# Patient Record
Sex: MALE | Race: WHITE
[De-identification: names, ages, dates, MRNs, and addresses within clinical notes are randomized per-mention and may not be internally consistent; named-entity substitution may affect disease eponyms.]

---

## 2018-11-27 ENCOUNTER — HOSPITAL ENCOUNTER (EMERGENCY)
Dept: HOSPITAL 61 - ER | Age: 53
Discharge: HOME | End: 2018-11-27
Payer: COMMERCIAL

## 2018-11-27 VITALS — DIASTOLIC BLOOD PRESSURE: 83 MMHG | SYSTOLIC BLOOD PRESSURE: 154 MMHG

## 2018-11-27 VITALS — WEIGHT: 240 LBS | HEIGHT: 69 IN | BODY MASS INDEX: 35.55 KG/M2
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
## 2018-11-27 DIAGNOSIS — M25.522: ICD-10-CM

## 2018-11-27 DIAGNOSIS — S22.32XA: Primary | ICD-10-CM

## 2018-11-27 DIAGNOSIS — I10: ICD-10-CM
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
## 2018-11-27 DIAGNOSIS — E11.9: ICD-10-CM

## 2018-11-27 LAB
APTT PPP: YELLOW S
BACTERIA #/AREA URNS HPF: 0 /HPF
BILIRUB UR QL STRIP: NEGATIVE
FIBRINOGEN PPP-MCNC: CLEAR MG/DL
HYALINE CASTS #/AREA URNS LPF: (no result) /HPF
NITRITE UR QL STRIP: NEGATIVE
PH UR STRIP: 5.5 [PH]
PROT UR STRIP-MCNC: NEGATIVE MG/DL
SQUAMOUS #/AREA URNS LPF: (no result) /LPF
UROBILINOGEN UR-MCNC: 0.2 MG/DL
WBC #/AREA URNS HPF: 0 /HPF (ref 0–4)

## 2018-11-27 PROCEDURE — 81001 URINALYSIS AUTO W/SCOPE: CPT

## 2018-11-27 PROCEDURE — 71101 X-RAY EXAM UNILAT RIBS/CHEST: CPT

## 2018-11-27 NOTE — RAD
Left RIBS with chest, 3 views, 11/27/2018:

 

HISTORY: Fall, rib pain

 

There is a lucency projected over the lateral aspect of the left 10th rib 

on one view suggesting a nondisplaced fracture. No other definite fracture

or rib abnormality is seen. There is no evidence of underlying 

pneumothorax, hemothorax or pulmonary infiltrate. The heart size is 

normal.

 

IMPRESSION: Probable left 10th rib fracture.

 

Electronically signed by: Rick Moritz, MD (11/27/2018 10:09 AM) [facility]

## 2018-11-27 NOTE — PHYS DOC
Past Medical History


Past Medical History:  Diabetes-Type II, Hypertension


Past Surgical History:  Other


Additional Past Surgical Histo:  right knee


Alcohol Use:  Rarely


Drug Use:  None





Adult General


Chief Complaint


Chief Complaint:  MECHANICAL FALL





HPI


HPI


Patient is a 53-year-old male who presents to the emergency department for 

evaluation. He states he was walking up the stairs at work, when he slipped on 

the ice, and fell. He states he landed on his left side, and is primarily 

having pain in his left lateral costal margin area. He did not hit his head, 

denies any headache or neck pain, and although he did hit his left elbow is not 

having any extremity pain. He also landed on his buttocks, although he is not 

having any coccygeal pain. Denies any neck or back pain, or any other extremity 

pain or other painful areas. Palpation of his left rib area as well as movement 

seems to worsen his pain in his ribs. There are no alleviating factors to his 

symptoms otherwise.





Review of Systems


Review of Systems





Constitutional: Denies fever or chills []


Eyes: Denies change in visual acuity, redness, or eye pain []


HENT: Denies nasal congestion or sore throat []


Respiratory: Denies cough or shortness of breath []


Cardiovascular:The patient denies any shortness of breath, chest pain, 

palpitations, or orthopnea []


GI: Denies abdominal pain, nausea, vomiting, bloody stools or diarrhea []


: Denies dysuria or hematuria []


Musculoskeletal: Denies back pain or joint pain, except as noted in the history 

of present illness. []


Integument: Denies rash or skin lesions []


Neurologic: Denies headache, focal weakness or sensory changes []








All other systems were reviewed and found to be within normal limits, except as 

documented in this note.





Current Medications


Current Medications





Current Medications








 Medications


  (Trade)  Dose


 Ordered  Sig/Mckayla  Start Time


 Stop Time Status Last Admin


Dose Admin


 


 Oxycodone/


 Acetaminophen


  (Percocet 5/325)  1 tab  1X  ONCE  11/27/18 09:30


 11/27/18 09:31 DC 11/27/18 09:47


1 TAB











Allergies


Allergies





Allergies








Coded Allergies Type Severity Reaction Last Updated Verified


 


  No Known Drug Allergies    11/27/18 No











Physical Exam


Physical Exam


PHYSICAL EXAM:





CONSTITUTIONAL: Well developed, well nourished


HEAD: normocephalic, atraumatic


EENT: PERRL, EOMI. Conjunctivae normal color, sclerae non-icteric; moist mucous 

membranes.


NECK: Supple, non-tender; no meningismus.There is full, painless range of 

motion of the cervical spine, without any focal bony midline tenderness to 

palpation.


LUNGS: Lungs CTA, breathing even and unlabored. Normal air movement.


HEART: Regular rate and rhythm, no murmur


CHEST: No deformity; there is tenderness to palpation noted to the left lateral 

inferior costal margin. There is no crepitus.


ABDOMEN: The abdomen is soft, and non-tender, no masses or bruits. Specifically

, the left upper quadrant of the abdomen is nontender.


EXTREM: Normal ROM; no deformity, no calf tenderness. Normal pulses palpable in 

all extremities. There is no pedal edema. There is no bony tenderness to 

palpation to the left elbow. There is normal range of motion. The remainder of 

the extremities are atraumatic.


SKIN: No rash; no diaphoresis


NEURO: Alert; normal speech and cognition; CN's grossly intact; strength 

grossly intact without focal deficit.


BACK: No CVA TTP.There is no bony tenderness to palpation of the thoracic or 

lumbar spine.





Current Patient Data


Vital Signs





 Vital Signs








  Date Time  Temp Pulse Resp B/P (MAP) Pulse Ox O2 Delivery O2 Flow Rate FiO2


 


11/27/18 09:05 97.9 80 17 154/83 (106) 98 Room Air  





 97.9       








Lab Values





 Laboratory Tests








Test


 11/27/18


10:13


 


Urine Collection Type Unknown  


 


Urine Color Yellow  


 


Urine Clarity Clear  


 


Urine pH 5.5  


 


Urine Specific Gravity 1.025  


 


Urine Protein


 Negative mg/dL


(NEG-TRACE)


 


Urine Glucose (UA)


 >=1000 mg/dL


(NEG)


 


Urine Ketones (Stick)


 Trace mg/dL


(NEG)


 


Urine Blood


 Moderate (NEG)





 


Urine Nitrite


 Negative (NEG)





 


Urine Bilirubin


 Negative (NEG)





 


Urine Urobilinogen Dipstick


 0.2 mg/dL (0.2


mg/dL)


 


Urine Leukocyte Esterase


 Negative (NEG)





 


Urine RBC


 11-20 /HPF


(0-2)


 


Urine WBC 0 /HPF (0-4)  


 


Urine Squamous Epithelial


Cells Occ /LPF  





 


Urine Bacteria


 0 /HPF (0-FEW)





 


Urine Hyaline Casts Few /HPF  


 


Urine Mucus Slight /LPF  











EKG


EKG


[]





Radiology/Procedures


Radiology/Procedures


[PROCEDURE: RIBS LEFT AND PA CHEST





Left RIBS with chest, 3 views, 11/27/2018:


 


HISTORY: Fall, rib pain


 


There is a lucency projected over the lateral aspect of the left 10th rib 


on one view suggesting a nondisplaced fracture. No other definite fracture


or rib abnormality is seen. There is no evidence of underlying 


pneumothorax, hemothorax or pulmonary infiltrate. The heart size is 


normal.


 


IMPRESSION: Probable left 10th rib fracture.]





Course & Med Decision Making


Course & Med Decision Making


Pertinent Labs and Imaging studies reviewed. (See chart for details)





10:45 AM: The patient's condition remained stable. His clinical exam is not 

suggestive of an acute intra-abdominal or retroperitoneal injury. He does have 

a small amount of microscopic hematuria. I discussed doing additional imaging 

with the patient, but he is anxious to get going, and does not want any 

additional testing at this time. I did discuss the risks of undiagnosed 

intraperitoneal or retroperitoneal injury, although again the clinical 

suspicion for this is low, and the patient did express understanding. I 

discussed importance of close outpatient follow-up.





Dragon Disclaimer


Dragon Disclaimer


This electronic medical record was generated, in whole or in part, using a 

voice recognition dictation system.





Departure


Departure


Impression:  


 Primary Impression:  


 Rib fracture


 Additional Impressions:  


 Accidental fall


 Hematuria


Disposition:  01 HOME, SELF-CARE


Condition:  STABLE


Referrals:  


MARY DENNY MD


Patient Instructions:  Hematuria, Adult, Incentive Spirometer, Rib Fracture


Scripts


Acetaminophen With Codeine (TYLENOL WITH CODEINE #3 TABLET) 1 Each Tablet


1 TAB PO PRN Q6HRS PRN for PAIN, #15 TAB


   Prov: KAZ BABCOCK MD         11/27/18





Problem Qualifiers











KAZ BABCOCK MD Nov 27, 2018 09:21